# Patient Record
(demographics unavailable — no encounter records)

---

## 2024-12-18 NOTE — PHYSICAL EXAM
[Awake] : awake [Alert] : alert [Acute Distress] : no acute distress [Mass] : no breast mass [Nipple Discharge] : no nipple discharge [Axillary LAD] : no axillary lymphadenopathy [Soft] : soft [Tender] : non tender [Oriented x3] : oriented to person, place, and time [Normal] : urethra [No Bleeding] : there was no active vaginal bleeding [Uterine Adnexae] : were not tender and not enlarged [de-identified] : pt extremely anxious always has severe difficulty with pelvic exam [FreeTextEntry4] : limited exam reveals atrophic vagina; unable to palpate cervix, uterus or adnexa secondary to patient discomfort [FreeTextEntry5] : unable to palpate or visualize as patient intolerant of exam [Chaperone Present] : A chaperone was present in the examining room during all aspects of the physical examination [41731] : A chaperone was present during the pelvic exam. [FreeTextEntry2] : ines burton